# Patient Record
Sex: MALE | URBAN - METROPOLITAN AREA
[De-identification: names, ages, dates, MRNs, and addresses within clinical notes are randomized per-mention and may not be internally consistent; named-entity substitution may affect disease eponyms.]

---

## 2018-10-01 ENCOUNTER — APPOINTMENT (OUTPATIENT)
Age: 43
Setting detail: DERMATOLOGY
End: 2018-10-02

## 2018-10-01 DIAGNOSIS — L73.1 PSEUDOFOLLICULITIS BARBAE: ICD-10-CM

## 2018-10-01 PROCEDURE — OTHER COUNSELING: OTHER

## 2018-10-01 PROCEDURE — OTHER PRESCRIPTION: OTHER

## 2018-10-01 PROCEDURE — 99213 OFFICE O/P EST LOW 20 MIN: CPT

## 2018-10-01 RX ORDER — DOXYCYCLINE 100 MG/1
TABLET, FILM COATED ORAL
Qty: 60 | Refills: 5 | Status: ERX

## 2018-10-01 ASSESSMENT — LOCATION ZONE DERM: LOCATION ZONE: NECK

## 2018-10-01 ASSESSMENT — LOCATION DETAILED DESCRIPTION DERM
LOCATION DETAILED: RIGHT SUPERIOR ANTERIOR NECK
LOCATION DETAILED: LEFT SUPERIOR LATERAL NECK

## 2018-10-01 ASSESSMENT — LOCATION SIMPLE DESCRIPTION DERM
LOCATION SIMPLE: LEFT ANTERIOR NECK
LOCATION SIMPLE: RIGHT ANTERIOR NECK

## 2018-10-01 NOTE — HPI: MEDICATION
Since Your Last Visit, How Has Your Condition Changed?: much better
Additional History: Pt states he is open to trying any new “creams” that are available. Has extensive treatment hx for acne, folliculitis. Says he has tried “about everything including accutane.”
What Is The Medication?: Doxycycline 100mg and Duac
What Condition Does This Medication Treat?: Acne, folliculitis

## 2018-10-01 NOTE — PROCEDURE: COUNSELING
Patient Specific Counseling (Will Not Stick From Patient To Patient): Continue Tend Skin, Discussed LHR pt is not interested
Detail Level: Detailed

## 2019-04-30 ENCOUNTER — APPOINTMENT (OUTPATIENT)
Age: 44
Setting detail: DERMATOLOGY
End: 2019-05-05

## 2019-04-30 DIAGNOSIS — L24 IRRITANT CONTACT DERMATITIS: ICD-10-CM

## 2019-04-30 PROBLEM — L24.9 IRRITANT CONTACT DERMATITIS, UNSPECIFIED CAUSE: Status: ACTIVE | Noted: 2019-04-30

## 2019-04-30 PROCEDURE — OTHER PRESCRIPTION: OTHER

## 2019-04-30 PROCEDURE — OTHER COUNSELING: OTHER

## 2019-04-30 PROCEDURE — 99213 OFFICE O/P EST LOW 20 MIN: CPT

## 2019-04-30 RX ORDER — MUPIROCIN 20 MG/G
OINTMENT TOPICAL
Qty: 1 | Refills: 0 | Status: ERX

## 2019-04-30 RX ORDER — TRIAMCINOLONE ACETONIDE 0.1 %
OINTMENT (GRAM) TOPICAL
Qty: 1 | Refills: 0 | Status: ERX

## 2019-04-30 ASSESSMENT — LOCATION ZONE DERM: LOCATION ZONE: TRUNK

## 2019-04-30 ASSESSMENT — LOCATION SIMPLE DESCRIPTION DERM: LOCATION SIMPLE: ABDOMEN

## 2019-04-30 ASSESSMENT — LOCATION DETAILED DESCRIPTION DERM: LOCATION DETAILED: PERIUMBILICAL SKIN

## 2019-04-30 NOTE — HPI: RASH
What Type Of Note Output Would You Prefer (Optional)?: Bullet Format
How Severe Is Your Rash?: mild
Is This A New Presentation, Or A Follow-Up?: Rash
Additional History: No new medications.  No changes to health per patient.

## 2020-01-08 ENCOUNTER — APPOINTMENT (OUTPATIENT)
Age: 45
Setting detail: DERMATOLOGY
End: 2020-01-11

## 2020-01-08 DIAGNOSIS — L73.1 PSEUDOFOLLICULITIS BARBAE: ICD-10-CM

## 2020-01-08 PROCEDURE — OTHER INTRALESIONAL KENALOG: OTHER

## 2020-01-08 PROCEDURE — OTHER ADDITIONAL NOTES: OTHER

## 2020-01-08 PROCEDURE — OTHER COUNSELING: OTHER

## 2020-01-08 PROCEDURE — OTHER PRESCRIPTION: OTHER

## 2020-01-08 PROCEDURE — 11900 INJECT SKIN LESIONS </W 7: CPT

## 2020-01-08 PROCEDURE — OTHER MEDICATION COUNSELING: OTHER

## 2020-01-08 RX ORDER — DOXYCYCLINE 100 MG/1
TABLET, FILM COATED ORAL
Qty: 60 | Refills: 5 | Status: ERX

## 2020-01-08 ASSESSMENT — LOCATION SIMPLE DESCRIPTION DERM
LOCATION SIMPLE: LEFT ANTERIOR NECK
LOCATION SIMPLE: RIGHT ANTERIOR NECK

## 2020-01-08 ASSESSMENT — LOCATION ZONE DERM: LOCATION ZONE: NECK

## 2020-01-08 ASSESSMENT — LOCATION DETAILED DESCRIPTION DERM
LOCATION DETAILED: LEFT SUPERIOR ANTERIOR NECK
LOCATION DETAILED: LEFT SUPERIOR LATERAL NECK
LOCATION DETAILED: RIGHT SUPERIOR ANTERIOR NECK

## 2020-01-08 NOTE — PROCEDURE: MEDICATION COUNSELING
Xellizzyz Pregnancy And Lactation Text: This medication is Pregnancy Category D and is not considered safe during pregnancy.  The risk during breast feeding is also uncertain.

## 2020-01-08 NOTE — PROCEDURE: ADDITIONAL NOTES
Additional Notes: Patient states his rash is well controlled and every few months will have an outbreak which doxycycline quickly resolves. Today patient is flared. Will give refills. All questions answered.
Detail Level: Simple

## 2020-01-08 NOTE — PROCEDURE: INTRALESIONAL KENALOG
Total Volume Injected (Ccs- Only Use Numbers And Decimals): .3
Consent: The risks of atrophy were reviewed with the patient.
Detail Level: Detailed
Kenalog Preparation: Kenalog
Concentration Of Solution Injected (Mg/Ml): 3.0
Administered By (Optional): RN
X Size Of Lesion In Cm (Optional): 0
Medical Necessity Clause: This procedure was medically necessary because the lesions that were treated were:
Include Z78.9 (Other Specified Conditions Influencing Health Status) As An Associated Diagnosis?: No

## 2020-01-08 NOTE — PROCEDURE: COUNSELING
Detail Level: Detailed
Patient Specific Counseling (Will Not Stick From Patient To Patient): Continue Tend Skin, Discussed LHR pt is not interested

## 2021-05-11 ENCOUNTER — RX ONLY (RX ONLY)
Age: 46
End: 2021-05-11

## 2021-05-11 RX ORDER — DOXYCYCLINE 100 MG/1
TABLET, FILM COATED ORAL
Qty: 30 | Refills: 0 | Status: CANCELLED

## 2021-06-02 ENCOUNTER — APPOINTMENT (OUTPATIENT)
Dept: URBAN - METROPOLITAN AREA CLINIC 271 | Age: 46
Setting detail: DERMATOLOGY
End: 2021-06-03

## 2021-06-02 DIAGNOSIS — L738 OTHER SPECIFIED DISEASES OF HAIR AND HAIR FOLLICLES: ICD-10-CM

## 2021-06-02 DIAGNOSIS — L663 OTHER SPECIFIED DISEASES OF HAIR AND HAIR FOLLICLES: ICD-10-CM

## 2021-06-02 PROBLEM — L02.92 FURUNCLE, UNSPECIFIED: Status: ACTIVE | Noted: 2021-06-02

## 2021-06-02 PROCEDURE — 99213 OFFICE O/P EST LOW 20 MIN: CPT

## 2021-06-02 PROCEDURE — OTHER COUNSELING: OTHER

## 2021-06-02 PROCEDURE — OTHER PRESCRIPTION: OTHER

## 2021-06-02 PROCEDURE — OTHER TREATMENT REGIMEN: OTHER

## 2021-06-02 RX ORDER — DOXYCYCLINE HYCLATE 100 MG/1
CAPSULE, GELATIN COATED ORAL
Qty: 30 | Refills: 1 | Status: CANCELLED

## 2021-06-02 NOTE — PROCEDURE: TREATMENT REGIMEN
Detail Level: Zone
Samples Given: Seysara 60 mg
Initiate Treatment: Take Seysara 60 mg  1 daily for 6 days then switch back to doxycycline

## 2021-09-28 ENCOUNTER — RX ONLY (RX ONLY)
Age: 46
End: 2021-09-28

## 2021-09-28 ENCOUNTER — APPOINTMENT (OUTPATIENT)
Dept: URBAN - METROPOLITAN AREA CLINIC 271 | Age: 46
Setting detail: DERMATOLOGY
End: 2021-09-28

## 2021-09-28 DIAGNOSIS — L738 OTHER SPECIFIED DISEASES OF HAIR AND HAIR FOLLICLES: ICD-10-CM

## 2021-09-28 DIAGNOSIS — L663 OTHER SPECIFIED DISEASES OF HAIR AND HAIR FOLLICLES: ICD-10-CM

## 2021-09-28 PROBLEM — L02.92 FURUNCLE, UNSPECIFIED: Status: ACTIVE | Noted: 2021-09-28

## 2021-09-28 PROCEDURE — OTHER MIPS QUALITY: OTHER

## 2021-09-28 PROCEDURE — OTHER PRESCRIPTION: OTHER

## 2021-09-28 PROCEDURE — 99213 OFFICE O/P EST LOW 20 MIN: CPT

## 2021-09-28 PROCEDURE — OTHER COUNSELING: OTHER

## 2021-09-28 RX ORDER — DOXYCYCLINE 100 MG/1
CAPSULE ORAL
Qty: 30 | Refills: 1 | Status: ERX

## 2021-09-28 RX ORDER — DOXYCYCLINE HYCLATE 100 MG/1
CAPSULE, GELATIN COATED ORAL
Qty: 30 | Refills: 1 | Status: CANCELLED
Stop reason: CLARIF

## 2021-09-28 RX ORDER — HYPOCHLOROUS/SOD CHLOR/SOD PHO
SPRAY, NON-AEROSOL (ML) TOPICAL
Qty: 237 | Refills: 3 | Status: ERX

## 2021-09-28 NOTE — PROCEDURE: COUNSELING
Detail Level: Detailed
Patient Specific Counseling (Will Not Stick From Patient To Patient): **Continue Hibaclens daily and can alternate with BPO wash daily\\n**Start doxycycline 100 mg once daily with food x 2 months

## 2022-10-18 ENCOUNTER — RX ONLY (RX ONLY)
Age: 47
End: 2022-10-18

## 2022-10-18 RX ORDER — CLINDAMYCIN PHOSPHATE 10 MG/ML
LOTION TOPICAL
Qty: 60 | Refills: 0 | Status: ERX

## 2022-10-18 RX ORDER — DOXYCYCLINE 100 MG/1
CAPSULE ORAL
Qty: 30 | Refills: 0 | Status: ERX

## 2023-07-25 ENCOUNTER — APPOINTMENT (OUTPATIENT)
Dept: URBAN - METROPOLITAN AREA CLINIC 304 | Age: 48
Setting detail: DERMATOLOGY
End: 2023-07-25

## 2023-07-25 DIAGNOSIS — L71.8 OTHER ROSACEA: ICD-10-CM

## 2023-07-25 PROCEDURE — OTHER COUNSELING: OTHER

## 2023-07-25 PROCEDURE — 99214 OFFICE O/P EST MOD 30 MIN: CPT

## 2023-07-25 PROCEDURE — OTHER PRESCRIPTION: OTHER

## 2023-07-25 PROCEDURE — OTHER PRESCRIPTION MEDICATION MANAGEMENT: OTHER

## 2023-07-25 PROCEDURE — OTHER MIPS QUALITY: OTHER

## 2023-07-25 RX ORDER — METRONIDAZOLE 7.5 MG/G
CREAM TOPICAL
Qty: 45 | Refills: 4 | Status: ERX | COMMUNITY
Start: 2023-07-25

## 2023-07-25 RX ORDER — DOXYCYCLINE 40 MG/1
CAPSULE ORAL
Qty: 30 | Refills: 3 | Status: ERX | COMMUNITY
Start: 2023-07-25

## 2023-07-25 NOTE — PROCEDURE: PRESCRIPTION MEDICATION MANAGEMENT
Render In Strict Bullet Format?: No
Detail Level: Zone
Initiate Treatment: Metronidazole cream 0.75% bid \\nOracea 40 mg 1 pill daily for flares
Samples Given: Eucerin oil control sunscreen

## 2023-09-01 ENCOUNTER — APPOINTMENT (OUTPATIENT)
Dept: URBAN - METROPOLITAN AREA CLINIC 304 | Age: 48
Setting detail: DERMATOLOGY
End: 2023-09-04

## 2023-09-01 ENCOUNTER — RX ONLY (RX ONLY)
Age: 48
End: 2023-09-01

## 2023-09-01 DIAGNOSIS — L259 CONTACT DERMATITIS AND OTHER ECZEMA, UNSPECIFIED CAUSE: ICD-10-CM

## 2023-09-01 PROBLEM — L23.9 ALLERGIC CONTACT DERMATITIS, UNSPECIFIED CAUSE: Status: ACTIVE | Noted: 2023-09-01

## 2023-09-01 PROCEDURE — OTHER MIPS QUALITY: OTHER

## 2023-09-01 PROCEDURE — OTHER INTRAMUSCULAR KENALOG: OTHER

## 2023-09-01 PROCEDURE — OTHER COUNSELING: OTHER

## 2023-09-01 PROCEDURE — OTHER PRESCRIPTION MEDICATION MANAGEMENT: OTHER

## 2023-09-01 PROCEDURE — 96372 THER/PROPH/DIAG INJ SC/IM: CPT

## 2023-09-01 RX ORDER — AZITHROMYCIN DIHYDRATE 250 MG/1
TABLET, FILM COATED ORAL
Qty: 1 | Refills: 0 | Status: ERX | COMMUNITY
Start: 2023-09-01

## 2023-09-01 ASSESSMENT — LOCATION ZONE DERM
LOCATION ZONE: FACE
LOCATION ZONE: TRUNK

## 2023-09-01 ASSESSMENT — ITCH NUMERIC RATING SCALE: HOW SEVERE IS YOUR ITCHING?: 1

## 2023-09-01 ASSESSMENT — LOCATION SIMPLE DESCRIPTION DERM
LOCATION SIMPLE: LEFT CHEEK
LOCATION SIMPLE: RIGHT CHEEK
LOCATION SIMPLE: LEFT BUTTOCK

## 2023-09-01 ASSESSMENT — LOCATION DETAILED DESCRIPTION DERM
LOCATION DETAILED: LEFT BUTTOCK
LOCATION DETAILED: LEFT CENTRAL MALAR CHEEK
LOCATION DETAILED: RIGHT CENTRAL MALAR CHEEK

## 2023-09-01 ASSESSMENT — PAIN INTENSITY VAS: HOW INTENSE IS YOUR PAIN 0 BEING NO PAIN, 10 BEING THE MOST SEVERE PAIN POSSIBLE?: 5/10 PAIN

## 2023-09-01 ASSESSMENT — SEVERITY ASSESSMENT: SEVERITY: MODERATE

## 2023-09-01 ASSESSMENT — BSA RASH: BSA RASH: 2

## 2023-09-01 NOTE — HPI: BODY LOCATION - FACE
How Severe Is Your Condition?: mild
Additional History: Pt states has roseaca, woke up Tuesday to face sore and puffy. Using metronidazole for the roseaca, but not daily.

## 2023-09-01 NOTE — PROCEDURE: PRESCRIPTION MEDICATION MANAGEMENT
Plan: Treated for possible sinus infection; pt to follow with PCP if not improvement
Render In Strict Bullet Format?: No
Detail Level: Zone
Initiate Treatment: Denys take as directed

## 2024-05-08 ENCOUNTER — RX ONLY (RX ONLY)
Age: 49
End: 2024-05-08

## 2024-05-08 ENCOUNTER — APPOINTMENT (OUTPATIENT)
Dept: URBAN - METROPOLITAN AREA CLINIC 304 | Age: 49
Setting detail: DERMATOLOGY
End: 2024-05-08

## 2024-05-08 DIAGNOSIS — L71.8 OTHER ROSACEA: ICD-10-CM

## 2024-05-08 PROCEDURE — 99214 OFFICE O/P EST MOD 30 MIN: CPT

## 2024-05-08 PROCEDURE — OTHER PRESCRIPTION MEDICATION MANAGEMENT: OTHER

## 2024-05-08 PROCEDURE — OTHER COUNSELING: OTHER

## 2024-05-08 PROCEDURE — OTHER PRESCRIPTION: OTHER

## 2024-05-08 PROCEDURE — OTHER MIPS QUALITY: OTHER

## 2024-05-08 RX ORDER — AZELAIC ACID 0.15 G/G
GEL TOPICAL
Qty: 50 | Refills: 1 | Status: CANCELLED | COMMUNITY
Start: 2024-05-08

## 2024-05-08 RX ORDER — DOXYCYCLINE 100 MG/1
CAPSULE ORAL
Qty: 30 | Refills: 1 | Status: ERX | COMMUNITY
Start: 2024-05-08

## 2024-05-08 RX ORDER — AZELAIC ACID 0.15 G/G
GEL TOPICAL
Qty: 50 | Refills: 3 | Status: ERX

## 2024-05-08 RX ORDER — OXYMETAZOLINE HYDROCHLORIDE 1 G/100G
CREAM TOPICAL
Qty: 30 | Refills: 2 | Status: ERX | COMMUNITY
Start: 2024-05-08

## 2024-05-08 NOTE — HPI: RASH (ROSACEA)
How Severe Is Your Rosacea?: moderate
Is This A New Presentation, Or A Follow-Up?: Follow Up Rosacea
Additional History: Patient seen 07/2023 for rosacea. He states that his rosacea has been flaring and worse around his cheeks and nose. He states that his nose is very red, and bothersome to him. He feels that metronidazole cream has stopped working. No other treatments tried.

## 2024-05-08 NOTE — PROCEDURE: PRESCRIPTION MEDICATION MANAGEMENT
Continue Regimen: Metronidazole cream 0.75% bid
Render In Strict Bullet Format?: No
Detail Level: Zone
Initiate Treatment: azelaic acid 15 % topical gel \\n\\nDoxycycline 100mg once daily\\n\\nRhofade 1 % topical cream use for rosacea flushing every 6 hours
Samples Given: Eucerin oil control sunscreen